# Patient Record
Sex: MALE | Race: WHITE
[De-identification: names, ages, dates, MRNs, and addresses within clinical notes are randomized per-mention and may not be internally consistent; named-entity substitution may affect disease eponyms.]

---

## 2018-01-29 ENCOUNTER — HOSPITAL ENCOUNTER (EMERGENCY)
Dept: HOSPITAL 74 - FER | Age: 18
Discharge: HOME | End: 2018-01-29
Payer: COMMERCIAL

## 2018-01-29 VITALS — TEMPERATURE: 98.1 F | SYSTOLIC BLOOD PRESSURE: 120 MMHG | DIASTOLIC BLOOD PRESSURE: 75 MMHG | HEART RATE: 80 BPM

## 2018-01-29 VITALS — BODY MASS INDEX: 22.4 KG/M2
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
## 2018-01-29 DIAGNOSIS — S63.630A: Primary | ICD-10-CM
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
## 2018-02-15 ENCOUNTER — HOSPITAL ENCOUNTER (EMERGENCY)
Dept: HOSPITAL 74 - FER | Age: 18
Discharge: HOME | End: 2018-02-15
Payer: COMMERCIAL

## 2018-02-15 VITALS — DIASTOLIC BLOOD PRESSURE: 84 MMHG | TEMPERATURE: 99.4 F | HEART RATE: 74 BPM | SYSTOLIC BLOOD PRESSURE: 109 MMHG

## 2018-02-15 VITALS — BODY MASS INDEX: 28.1 KG/M2

## 2018-02-15 DIAGNOSIS — J02.9: Primary | ICD-10-CM

## 2018-02-15 NOTE — PDOC
History of Present Illness





- General


History Source: Patient


Exam Limitations: No Limitations





- History of Present Illness


Initial Comments: 





02/15/18 17:10


The patient is a 17 year old male with no significant past medical history who 

presents to the ED complaining of several days of sore throat with associated 

nonproductive cough, malaise, and hoarse voice. Sore throat is moderate, 

constant, with no alleviating factors. No fever or chills. 





<Rehana Silveira - Last Filed: 02/15/18 19:08>





<Froylan Gandhi - Last Filed: 02/15/18 19:19>





- General


Chief Complaint: Sore Throat


Stated Complaint: SORE THROAT


Time Seen by Provider: 02/15/18 16:47





Past History





<Rehana Silveira - Last Filed: 02/15/18 19:08>





- Past Medical History


COPD: No





- Immunization History


Immunization Up to Date: No





- Suicide/Smoking/Psychosocial Hx


Smoking History: Never smoked


Have you smoked in the past 12 months: No


Hx Alcohol Use: No


Drug/Substance Use Hx: No


Substance Use Type: None





<Froylan Gandhi - Last Filed: 02/15/18 19:19>





- Past Medical History


Allergies/Adverse Reactions: 


 Allergies











Allergy/AdvReac Type Severity Reaction Status Date / Time


 


No Known Allergies Allergy   Verified 02/15/18 16:39











Home Medications: 


Ambulatory Orders





NK [No Known Home Medication]  01/29/18 











**Review of Systems





- Review of Systems


Able to Perform ROS?: Yes


Comments:: 





02/15/18 17:13


A complete review of 10 out of 10 review of systems is taken and is negative 

apart from what is previously mentioned below and in the HPI.





<Rehana Silveira - Last Filed: 02/15/18 19:08>





*Physical Exam





- Physical Exam


Comments: 





02/15/18 17:15


Vitals: Triage vital signs reviewed


General Appearance: No acute distress, well nourished, well developed


Head: Atraumatic


Eyes: Pupils equal reactive round, extraocular movement intact


Ears:  TM's normal bilaterally


Nose: Nares patent bilaterally; +mild nasal congestion


Throat: +erythematous posterior oropharynx, mucous membranes moist


Neck:  Supple; No nuchal rigidity


Cardiac: Regular rate and rhythm, no murmurs, no rubs, no gallops


Lungs: Clear to auscultation bilateral, good air movement bilaterally


Abdomen:  Soft, nondistended, normal bowel sounds, nontender to palpation


Extremities: Full range of motion to all extremities, no cyanosis, clubbing, or 

edema


Skin:  Warm and dry, no rashes or lesions, no rash, no petechiae


Neuro:  AOX3; Cranial Nerves 2-12 grossly intact, Strength intact to all 

extremities, Sensation intact to all extremities, gait normal


Psych: Normal mood, normal affect








<Rehana Silveira - Last Filed: 02/15/18 19:08>





Medical Decision Making





- Medical Decision Making





02/15/18 17:21


17 year old male with no significant history here for several days of sore 

throat with nonproductive cough and mild nasal congestion. 


Plan: 


-rapid strep


-PO Motrin





<Rehana Silveira - Last Filed: 02/15/18 19:08>





- Medical Decision Making








02/15/18 19:17





Rapid strep negative patient well-appearing no apparent distress. We'll 

recommend alternating Tylenol and Motrin patient struck her to return to ED for 

any severe worsening symptoms or for any concerns





Findings, the need for follow-up, strict return instructions discussed with 

patient.








<Froylan Gandhi - Last Filed: 02/15/18 19:19>





*DC/Admit/Observation/Transfer





- Attestations


Scribe Attestion: 





02/15/18 17:16





Documentation prepared by Rehana Silveira, acting as medical scribe for Froylan Gandhi MD.





<Rehana Silveira - Last Filed: 02/15/18 19:08>





- Discharge Dispostion


Admit: No





<Froylan Gandhi - Last Filed: 02/15/18 19:19>


Diagnosis at time of Disposition: 


Pharyngitis


Qualifiers:


 Pharyngitis/tonsillitis etiology: unspecified etiology Qualified Code(s): 

J02.9 - Acute pharyngitis, unspecified








- Patient Instructions


Printed Discharge Instructions:  Viral Pharyngitis


Additional Instructions: 


Drink plenty of fluids. Follow up with her primary care provider this week. 

Alternate Tylenol Motrin every 3 hours as needed for pain or fever as directed 

on package. Return to the emergency department for severe worsening systems 

difficulty breathing swallowing or for any concerns.

## 2019-02-12 ENCOUNTER — HOSPITAL ENCOUNTER (EMERGENCY)
Dept: HOSPITAL 74 - FER | Age: 19
Discharge: HOME | End: 2019-02-12
Payer: COMMERCIAL

## 2019-02-12 VITALS — BODY MASS INDEX: 24.4 KG/M2

## 2019-02-12 VITALS — SYSTOLIC BLOOD PRESSURE: 116 MMHG | HEART RATE: 62 BPM | DIASTOLIC BLOOD PRESSURE: 71 MMHG | TEMPERATURE: 98.6 F

## 2019-02-12 DIAGNOSIS — N52.9: ICD-10-CM

## 2019-02-12 DIAGNOSIS — G47.00: ICD-10-CM

## 2019-02-12 DIAGNOSIS — F43.9: Primary | ICD-10-CM

## 2019-02-12 LAB
BILIRUB UR STRIP.AUTO-MCNC: (no result) MG/DL
PH UR: 7 [PH] (ref 4.5–8)
RBC # BLD AUTO: (no result) /HPF (ref 0–3)
SP GR UR: 1.02 (ref 1.01–1.03)
UROBILINOGEN UR STRIP-MCNC: (no result) MG/DL (ref 0.2–1)
WBC # UR AUTO: (no result) /UL (ref 0–2)

## 2019-02-12 NOTE — PDOC
Attending Attestation





- Resident


Resident Name: SandhyaLeonora





- ED Attending Attestation


I have performed the following: I have examined & evaluated the patient, The 

case was reviewed & discussed with the resident, I agree w/resident's findings 

& plan





- HPI


HPI: 





02/12/19 14:37








- Physicial Exam


PE: 





02/12/19 14:37








- Medical Decision Making





02/12/19 14:38

## 2019-02-12 NOTE — PDOC
History of Present Illness





<IleneMagdalene - Last Filed: 02/12/19 15:58>





- History of Present Illness


Initial Comments: 


17yo with no significant PMH complaining of trouble sleeping, as well as 

difficulty obtaining and maintaining an erection. Patient reports that both 

these issues have been going on for several months. He does endorse anxiety 

related to dropping out of school in October and his parents' divorce. Patient 

reports having trouble falling asleep. He feels like he will lay in bed for 

hours. He falls asleep around 3 or 4am and will sleep until 11am or noon if he 

does not have responsibilities. Patient also reports screen use including his 

phone and television in bed. Patient has tried melatonin which he says did not 

help, but took nyquil last night around 3am and he fell asleep around 4am. He 

has a history of anxiety for which he has seen a therapist, Dr. Sanabria, once. 

Patient reports that he has not seen this therapist again because of anxiety 

and scheduling issues. No history of STIs. No penile discharge, genital rashes, 

dysuria or hematuria. Denies fever, chills, chest pain, or shortness of breath. 








<SandhyaLeonora - Last Filed: 02/12/19 16:05>





- General


Chief Complaint: Urinary Problem


Stated Complaint: i cant maintain an erection


Time Seen by Provider: 02/12/19 14:33





Past History





<IleneMagdalene - Last Filed: 02/12/19 15:58>





- Past Medical History


COPD: No


Other medical history: denies





- Immunization History


Immunization Up to Date: No





- Suicide/Smoking/Psychosocial Hx


Smoking History: Never smoked


Have you smoked in the past 12 months: No


Hx Alcohol Use: Yes (ocasional)


Drug/Substance Use Hx: Yes (marijuana)


Substance Use Type: None





<SandhyaLeonora - Last Filed: 02/12/19 16:05>





- Past Medical History


Allergies/Adverse Reactions: 


 Allergies











Allergy/AdvReac Type Severity Reaction Status Date / Time


 


No Known Allergies Allergy   Verified 02/12/19 14:27











Home Medications: 


Ambulatory Orders





Sildenafil Citrate [Viagra] 25 mg PO ONCE #7 tablet 02/12/19 











**Review of Systems





- Review of Systems


Comments:: 


Constitutional: no fever, no chills


HEENT: no throat pain, no dysphagia


Cardiovascular: no chest pain, no palpitations


Respiratory: no cough, no shortness of breath


Gastrointestinal: no abdominal pain, no nausea, no vomiting


Genitourinary: no dysuria, no frequency


Musculoskeletal: no myalgia, no arthralgia


Skin: no rash, no itching


Neurologic: no headache, no dizziness











<SandhyaLeonora - Last Filed: 02/12/19 16:05>





*Physical Exam





- Vital Signs


 Last Vital Signs











Temp Pulse Resp BP Pulse Ox


 


 98.6 F   62   16   116/71   100 


 


 02/12/19 14:27  02/12/19 14:27  02/12/19 14:27  02/12/19 14:27  02/12/19 14:27














<Magdalene Odom - Last Filed: 02/12/19 15:58>





- Vital Signs


 Last Vital Signs











Temp Pulse Resp BP Pulse Ox


 


 98.6 F   62   16   116/71   100 


 


 02/12/19 14:27  02/12/19 14:27  02/12/19 14:27  02/12/19 14:27  02/12/19 14:27














- Physical Exam


Comments: 


General: Awake, alert, and fully oriented, appears anxious with hand tapping 

movement


Head: No signs of trauma


Eyes: EOMI, sclera anicteric


ENT: Moist mucus membranes


Neck: Normal ROM, supple


Lungs: Lungs clear, Normal breath sounds


Cardio: Regular rhythm, S1 and S2 present


Abdomen: Soft, nontender. No guarding, no rebound, no masses


Extremities: Normal range of motion, Distal pulses present


SKIN: Warm, Dry, normal turgor


Neurologic: Cranial nerves II through XII grossly intact. Normal speech


: normal external anatomy, no penile discharge, no rashes or lesions; small 

0.5cm scar noted on inner right thigh 





<SandhyaLeonora - Last Filed: 02/12/19 16:05>





Moderate Sedation





- Procedure Monitoring


Vital Signs: 


Procedure Monitoring Vital Signs











Temperature  98.6 F   02/12/19 14:27


 


Pulse Rate  62   02/12/19 14:27


 


Respiratory Rate  16   02/12/19 14:27


 


Blood Pressure  116/71   02/12/19 14:27


 


O2 Sat by Pulse Oximetry (%)  100   02/12/19 14:27











<Magdalene Odom - Last Filed: 02/12/19 15:58>





- Procedure Monitoring


Vital Signs: 


Procedure Monitoring Vital Signs











Temperature  98.6 F   02/12/19 14:27


 


Pulse Rate  62   02/12/19 14:27


 


Respiratory Rate  16   02/12/19 14:27


 


Blood Pressure  116/71   02/12/19 14:27


 


O2 Sat by Pulse Oximetry (%)  100   02/12/19 14:27











<Leonora Arthur - Last Filed: 02/12/19 16:05>





ED Treatment Course





- ADDITIONAL ORDERS


Additional order review: 


 Laboratory  Results











  02/12/19





  14:58


 


Urine Color  Nelida


 


Urine Appearance  Clear


 


Urine pH  7.0


 


Ur Specific Gravity  1.025


 


Urine Protein  Negative


 


Urine Glucose (UA)  Negative


 


Urine Ketones  Trace


 


Urine Blood  Trace-intact H


 


Urine Nitrite  Negative


 


Urine Bilirubin  1+ H


 


Urine Urobilinogen  4.0 e.u/dl


 


Ur Leukocyte Esterase  Negative


 


Urine RBC  0-2


 


Urine WBC  0-2














<Magdalene Odom - Last Filed: 02/12/19 15:58>





Medical Decision Making





- Medical Decision Making


17yo with no significant PMH complaining of trouble sleeping, as well as 

difficulty obtaining and maintaining an erection. 


Poor sleep hygiene: patient lays in bed when not sleepy, exposed to blue light 

at night, history of anxiety


Counseled on sleep hygiene techniques


UA negative for infection


Urology referral


Primary care referral


02/12/19 15:40








<Leonora Arthur - Last Filed: 02/12/19 16:05>





*DC/Admit/Observation/Transfer





- Discharge Dispostion


Decision to Admit order: No





<Magdalene Odom - Last Filed: 02/12/19 15:58>





<Leonora Arthur - Last Filed: 02/12/19 16:05>


Diagnosis at time of Disposition: 


 Trouble getting to sleep, Erectile dysfunction, Stress








- Discharge Dispostion


Disposition: HOME


Condition at time of disposition: Stable





- Prescriptions


Prescriptions: 


Sildenafil Citrate [Viagra] 25 mg PO ONCE #7 tablet





- Referrals


Referrals: 


Los Alves MD [Staff Physician] - 


Merrill Espitia MD [Staff Physician] - 


Maxx Ralph MD [Staff Physician] - 


[facility] [Provider Group]





- Patient Instructions


Printed Discharge Instructions:  How stressed are you?, Tips for Better Sleep, 

DI for Impotence


Additional Instructions: 


You came into the ED for problems with sleep and maintaining an erection. 

Please read the attached handouts. 





We tested your urine and it was negative for infection 





We have referred you to urology for your erectile dysfunction. Call and make an 

appointment. It is also important that you follow up with a primary care 

provider to discuss your ER visit and further management of your symptoms. Just 

in case your parents do not have a primary care doctor in mind for you, we have 

referred you to the [facility]. 





Please seek immediate medical attention or return to the ER if you experience 

concerning or worsening symptoms.

## 2019-06-23 ENCOUNTER — HOSPITAL ENCOUNTER (EMERGENCY)
Dept: HOSPITAL 74 - FER | Age: 19
Discharge: HOME | End: 2019-06-23
Payer: COMMERCIAL

## 2019-06-23 VITALS — TEMPERATURE: 98.5 F | DIASTOLIC BLOOD PRESSURE: 75 MMHG | SYSTOLIC BLOOD PRESSURE: 121 MMHG | HEART RATE: 69 BPM

## 2019-06-23 VITALS — BODY MASS INDEX: 25.7 KG/M2

## 2019-06-23 DIAGNOSIS — K11.20: Primary | ICD-10-CM

## 2019-06-23 NOTE — PDOC
History of Present Illness





- General


Chief Complaint: Pain


Stated Complaint: RT LOWER JAW SWELLIING


Time Seen by Provider: 06/23/19 11:37


History Source: Patient


Exam Limitations: No Limitations





- History of Present Illness


Initial Comments: 





20 yo M no significant PMH presents with R cheek/jaw swelling for the past few 

days. He states that he has some pain with movement of his jaw. Denies any 

dental pain, gum swelling. No recent trauma. Denies fever, redness of the skin. 

This has not happened in the past. He did not take anything for it. 








Past History





- Past Medical History


Allergies/Adverse Reactions: 


 Allergies











Allergy/AdvReac Type Severity Reaction Status Date / Time


 


No Known Allergies Allergy   Verified 06/23/19 10:56











Home Medications: 


Ambulatory Orders





NK [No Known Home Medication]  06/23/19 








COPD: No


Other medical history: DENIES





- Immunization History


Immunization Up to Date: No





- Suicide/Smoking/Psychosocial Hx


Smoking History: Never smoked


Have you smoked in the past 12 months: No


Information on smoking cessation initiated: No


Hx Alcohol Use:  (SOCIAL)


Drug/Substance Use Hx: No


Substance Use Type: None





**Review of Systems





- Review of Systems


Able to Perform ROS?: Yes


Comments:: 





GENERAL/CONSTITUTIONAL: No fever or chills. No weakness.


HEAD, EYES, EARS, NOSE AND THROAT: No change in vision. No ear pain or 

discharge. No sore throat. +R sided jaw swelling. 


SKIN: No rash.


NEUROLOGIC: No headache, vertigo, loss of consciousness, or change in strength/

sensation.


ENDOCRINE: No increased thirst. No abnormal weight change.


HEMATOLOGIC/LYMPHATIC: No anemia, easy bleeding, or history of blood clots.


ALLERGIC/IMMUNOLOGIC: No hives or skin allergy.











*Physical Exam





- Vital Signs


 Last Vital Signs











Temp Pulse Resp BP Pulse Ox


 


 98.5 F   69   20   121/75   97 


 


 06/23/19 10:56  06/23/19 10:56  06/23/19 10:56  06/23/19 10:56  06/23/19 10:56














- Physical Exam


Comments: 





GENERAL: Awake, alert, and fully oriented, in no acute distress


HEAD: No signs of trauma. +Mod edema overlying the R mandible, with mild 

tenderness. No induration, no erythema, no open lesions


EYES: PERRLA, EOMI, sclera anicteric, conjunctiva clear


ENT: Auricles normal inspection, hearing grossly normal, nares patent, 

oropharynx clear without exudates. Moist mucosa. No pain to percussion of R 

lower teeth. No swelling of the buccal mucosa, no abscesses, no lesions, no 

dental caries


NECK: Normal ROM, supple, no lymphadenopathy, JVD, or masses


EXTREMITIES: Normal range of motion, no edema.  No clubbing or cyanosis. No 

cords, erythema, or tenderness


NEUROLOGICAL: Cranial nerves II through XII grossly intact.  Normal speech, 

normal gait. Motor and sensation intact


SKIN: Warm, Dry, normal turgor, no rashes or lesions noted. 








Medical Decision Making





- Medical Decision Making





Symptoms consistent with sialadenitis. No signs of superinfection at present. 

Recommended warm compresses, sialogogues. ENT f/u. Counseled to return to ED if 

it becomes indurated, red, worsens, or if he develops fever.








*DC/Admit/Observation/Transfer


Diagnosis at time of Disposition: 


 Sialoadenitis








- Discharge Dispostion


Disposition: HOME


Condition at time of disposition: Stable


Decision to Admit order: No





- Referrals


Referrals: 


Brian Louis MD [Staff Physician] - 





- Patient Instructions


Additional Instructions: 


Sialoadenitis





Care Notes


Aftercare Instructions En Espaol


WHAT YOU NEED TO KNOW:


Sialoadenitis is an inflammation or infection of one or more of your salivary 

glands. A small stone can block the salivary gland and cause inflammation. 

Infection may be caused by a virus or bacteria. You can develop sialoadenitis 

on one or both sides of your face. You may have sialoadenitis once, or it may 

come back and last a long time.





DISCHARGE INSTRUCTIONS:


Manage your sialoadenitis:


It is important to manage your sialoadenitis to help prevent future infections.





Drinking liquids: Adults should drink about 9 to 13 cups of liquid each day. 

One cup is 8 ounces. Good choices of liquids for most people include water, 

juice, and milk. Coffee, soup, and fruit may be counted in your daily liquid 

amount. Ask your healthcare provider how much liquid you should drink each day.


Keep your mouth moist: Suck on hard candy or chew sugarless gum to get your 

saliva flowing. Sour and tart flavors such as lemon and orange will help get 

saliva to flow. This will help keep your mouth moist and help push out a stone 

blocking your salivary duct.


Rinse your mouth: Use water or mouthwash to clean out pus that may be draining 

into your mouth.


Massage your jaw: Massage the area of your swollen gland. This may help relieve 

swelling and pain by pushing the pus out of the gland.


Apply heat: Place a warm, moist cloth on the area.


Medicines:


NSAIDs help decrease swelling and pain or fever. This medicine is available 

with or without a doctor's order. NSAIDs can cause stomach bleeding or kidney 

problems in certain people. If you take blood thinner medicine, always ask your 

healthcare provider if NSAIDs are safe for you. Always read the medicine label 

and follow directions.


Take your medicine as directed. Contact your healthcare provider if you think 

your medicine is not helping or if you have side effects. Tell him of her if 

you are allergic to any medicine. Keep a list of the medicines, vitamins, and 

herbs you take. Include the amounts, and when and why you take them. Bring the 

list or the pill bottles to follow-up visits. Carry your medicine list with you 

in case of an emergency.


Follow up with your healthcare provider or ear, nose, and throat specialist as 

directed:


Write down your questions so you remember to ask them during your visits.





Contact your healthcare provider or ear, nose, and throat specialist if:


The pain and swelling do not go away within 2 days, or they get worse.


Your mouth and eyes are very dry.


You lose movement on one side of your face.


You have questions about your condition or care.


Return to the emergency department if:


You have a fever.


Your salivary gland gets red and hot or drains pus.


You have trouble opening your mouth because of swelling.


You have trouble breathing or swallowing because of swelling.








- Post Discharge Activity

## 2020-07-06 ENCOUNTER — HOSPITAL ENCOUNTER (EMERGENCY)
Dept: HOSPITAL 74 - FER | Age: 20
Discharge: HOME | End: 2020-07-06
Payer: SELF-PAY

## 2020-07-06 VITALS — HEART RATE: 83 BPM | DIASTOLIC BLOOD PRESSURE: 91 MMHG | SYSTOLIC BLOOD PRESSURE: 129 MMHG | TEMPERATURE: 98.2 F

## 2020-07-06 VITALS — BODY MASS INDEX: 27.1 KG/M2
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
## 2020-07-06 DIAGNOSIS — S90.822A: Primary | ICD-10-CM

## 2021-01-14 ENCOUNTER — HOSPITAL ENCOUNTER (EMERGENCY)
Dept: HOSPITAL 74 - FER | Age: 21
Discharge: HOME | End: 2021-01-14
Payer: COMMERCIAL

## 2021-01-14 VITALS — BODY MASS INDEX: 31.1 KG/M2

## 2021-01-14 VITALS — TEMPERATURE: 99.2 F | DIASTOLIC BLOOD PRESSURE: 84 MMHG | HEART RATE: 64 BPM | SYSTOLIC BLOOD PRESSURE: 125 MMHG

## 2021-01-14 DIAGNOSIS — S09.90XA: Primary | ICD-10-CM

## 2021-08-05 NOTE — PDOC
History of Present Illness





- General


Chief Complaint: Rash


Stated Complaint: PAIN IN LEFT FOOT


History Source: Patient, Friend





- History of Present Illness


Initial Comments: 





07/10/20 20:40


Pt and his friend were hanging out and they noticed that he had a painful 

blister on the bottom of his foot between the 1st and 2nd toes and he was 

nervous that it may be infected.  Pt walks a lot with his friend and he often 

wears birkenstock sandals and fopotwear without socks.  States that he never got

a blister and he cannot believe that he has one now. Pt is concerned that the 

blister is an ominous finding. However, it is a simple fluid filled blister.


No other complaints.





Past History





- Travel History


Traveled outside of the country in the last 30 days: No


Close contact w/someone who was outside of country & ill: No





- Medical History


Allergies/Adverse Reactions: 


                                    Allergies











Allergy/AdvReac Type Severity Reaction Status Date / Time


 


No Known Allergies Allergy   Verified 06/23/19 10:56











Home Medications: 


Ambulatory Orders





Cephalexin [Keflex] 500 mg PO BID #14 capsule 07/06/20 








COPD: No





- Immunization History


Immunization Up to Date: No





- Psycho-Social/Smoking History


Smoking History: Never smoked


Have you smoked in the past 12 months: No


Information on smoking cessation initiated: No





- Substance Abuse Hx (Audit-C & DAST Scrn)


How often the patient has a drink containing alcohol: Monthly or less


Number of drinks the patient has on a typical day: 1 or 2


How often the patient has six or more drinks on one occasion: Never


Score: In Men: 4 or > Positive; In Women: 3 or > Positive: 1


Screen Result (Pos requires Nsg. Audit-10AR): Negative


In the last yr the pt used illegal drug/Rx for NonMed reason: No


Score:  Yes response is considered Positive: 0


Screen Result (Positive result requires Nsg. DAST-10): Negative





**Review of Systems





- Review of Systems


Constitutional: No: Symptoms Reported, See HPI, Chills, Diaphoresis, Fever, Loss

 of Appetite, Malaise, Night Sweats, Weakness, Weight Stable, Unintentional Wgt.

 Loss, Unexplained wgt Loss, Other


HEENTM: No: Symptoms Reported, See HPI, Eye Pain, Blurred Vision, Tearing, 

Recent change in vision, Double Vision, Cataracts, Ear Pain, Ocular Prothesis, 

Ear Discharge, Nose Pain, Nose Congestion, Tinnitus, Nose Bleeding, Hearing 

Loss, Throat Pain, Throat Swelling, Mouth Pain, Dental Problems, Difficulty 

Swallowing, Mouth Swelling, Other


Respiratory: No: Symptoms reported, See HPI, Cough, Orthopnea, Shortness of 

Breath, SOB with Exertion, SOB at Rest, Stridor, Wheezing, Productive cough, 

Hemoptysis, Other


ABD/GI: No: Symptoms Reported, See HPI, Abdominal Distended, Abd. Pain w/ 

defecation, Blood Streaked Bowels, Constipated, Diarrhea, Difficulty Swallowing,

 Nausea, Poor Appetite, Poor Fluid Intake, Rectal Bleeding, Vomiting, 

Indigestion, Abdominal cramping, Tarry Stools, Other


: No: Symptoms Reported, See HPI, Burning, Dysuria, Discharge, Frequency, 

Flank Pain, Hematuria, Incontinence, Pain, Urgency, Testicular Mass, Testicular 

Swelling, Lesions, Testicular Pain, Other


Musculoskeletal: No: Symptoms Reported, See HPI, Back Pain, Gout, Joint Pain, 

Joint Swelling, Muscle Pain, Muscle Weakness, Neck Pain, Joint Stiffness, Other


Integumentary: No: Symptoms Reported, See HPI, Bruising, Change in Color, Change

 in Hair/Nails, Dryness, Erythema, Flushing, Lesions, Lumps, Pallor, Pruritus, 

Rash, Sweating, Other


Neurological: No: Symptoms reported, See HPI, Headache, Numbness, Paresthesia, 

Pre-Existing Deficit, Seizure, Tingling, Tremors, Weakness, Unsteady Gait, 

Ataxia, Dizziness, Other


Psychiatric: No: Anxiety, Depression, Frequent Crying, Stressors, Sleep Pattern 

Change, Emotional Problems, Mood Swings, Change in Appetite, Other


Endocrine: No: Symptoms Reported, See HPI, Excessive Sweating, Flushing, 

Intolerance to Cold, Intolerance to Heat, Increased Hunger, Increased Thirst, 

Increased Urine, Unexplained Weight Gain, Unexplained Weight Loss, Change in 

Weight, Other


Hematologic/Lymphatic: No: Symptoms Reported, See HPI, Anemia, Blood Clots, Easy

 Bleeding, Easy Bruising, Bleeding Diathesis, Lymph Node Abnormalities, Swollen 

Glands, Other





*Physical Exam





- Vital Signs


                                Last Vital Signs











Temp Pulse Resp BP Pulse Ox


 


 98.2 F   83   18   129/91   99 


 


 07/06/20 03:09  07/06/20 03:09  07/06/20 03:09  07/06/20 03:09  07/06/20 03:09














- Physical Exam


General Appearance: Yes: Nourished, Appropriately Dressed.  No: Apparent 

Distress


HEENT: positive: EOMI, LEANNA, Normal ENT Inspection, Normal Voice, Symmetrical, 

TMs Normal, Pharynx Normal


Neck: positive: Tender, Normal Thyroid, Supple


Respiratory/Chest: positive: Lungs Clear, Normal Breath Sounds


Cardiovascular: positive: Regular Rhythm, Regular Rate, S1, S2


Gastrointestinal/Abdominal: positive: Normal Bowel Sounds, Flat, Soft


Extremity: positive: Normal Capillary Refill, Normal Inspection, Other (1cm x 

0.3 cm blister btwn his 1st and 2nd toes on the plantar aspect of the foot.)


Integumentary: positive: Normal Color, Dry, Warm


Neurologic: positive: CNs II-XII NML intact, Fully Oriented, Alert, Normal 

Mood/Affect, Normal Response, Motor Strength 5/5





Medical Decision Making





- Medical Decision Making





07/10/20 20:44


Pt has no redness and no rash around the blister.


Pt keeps touching it; I am concerned that if the blister breaks and he continues

 to manipulate it, it may get infected.


I will recommend bacitracin ointment for the blister.


I placed an order for abx in the pharmacy for the patient which he has the 

option to  in the event that the blister pops and gets infected.





Discharge





- Discharge Information


Problems reviewed: Yes


Clinical Impression/Diagnosis: 


 Blister, Blister of foot





Condition: Stable


Disposition: HOME





- Admission


No





- Additional Discharge Information


Prescriptions: 


Cephalexin [Keflex] 500 mg PO BID #14 capsule





- Follow up/Referral





- Patient Discharge Instructions


Patient Printed Discharge Instructions:  Blisters





- Post Discharge Activity no pedal edema